# Patient Record
Sex: MALE | Race: WHITE
[De-identification: names, ages, dates, MRNs, and addresses within clinical notes are randomized per-mention and may not be internally consistent; named-entity substitution may affect disease eponyms.]

---

## 2018-04-05 ENCOUNTER — HOSPITAL ENCOUNTER (EMERGENCY)
Dept: HOSPITAL 62 - ER | Age: 53
Discharge: HOME | End: 2018-04-05
Payer: SELF-PAY

## 2018-04-05 VITALS — SYSTOLIC BLOOD PRESSURE: 119 MMHG | DIASTOLIC BLOOD PRESSURE: 73 MMHG

## 2018-04-05 DIAGNOSIS — I10: ICD-10-CM

## 2018-04-05 DIAGNOSIS — Z95.5: ICD-10-CM

## 2018-04-05 DIAGNOSIS — R68.84: ICD-10-CM

## 2018-04-05 DIAGNOSIS — G40.909: ICD-10-CM

## 2018-04-05 DIAGNOSIS — I25.2: ICD-10-CM

## 2018-04-05 DIAGNOSIS — J45.909: ICD-10-CM

## 2018-04-05 DIAGNOSIS — R74.8: ICD-10-CM

## 2018-04-05 DIAGNOSIS — E11.9: ICD-10-CM

## 2018-04-05 DIAGNOSIS — M79.602: ICD-10-CM

## 2018-04-05 DIAGNOSIS — R07.89: Primary | ICD-10-CM

## 2018-04-05 LAB
ADD MANUAL DIFF: NO
ALBUMIN SERPL-MCNC: 4.2 G/DL (ref 3.5–5)
ALP SERPL-CCNC: 73 U/L (ref 38–126)
ALT SERPL-CCNC: 27 U/L (ref 21–72)
ANION GAP SERPL CALC-SCNC: 11 MMOL/L (ref 5–19)
AST SERPL-CCNC: 24 U/L (ref 17–59)
BASOPHILS # BLD AUTO: 0 10^3/UL (ref 0–0.2)
BASOPHILS NFR BLD AUTO: 0.6 % (ref 0–2)
BILIRUB DIRECT SERPL-MCNC: 0.3 MG/DL (ref 0–0.4)
BILIRUB SERPL-MCNC: 0.3 MG/DL (ref 0.2–1.3)
BUN SERPL-MCNC: 12 MG/DL (ref 7–20)
CALCIUM: 9.8 MG/DL (ref 8.4–10.2)
CHLORIDE SERPL-SCNC: 105 MMOL/L (ref 98–107)
CK MB SERPL-MCNC: 0.78 NG/ML (ref ?–4.55)
CK SERPL-CCNC: 152 U/L (ref 55–170)
CO2 SERPL-SCNC: 29 MMOL/L (ref 22–30)
EOSINOPHIL # BLD AUTO: 0.1 10^3/UL (ref 0–0.6)
EOSINOPHIL NFR BLD AUTO: 3.2 % (ref 0–6)
ERYTHROCYTE [DISTWIDTH] IN BLOOD BY AUTOMATED COUNT: 13.3 % (ref 11.5–14)
GLUCOSE SERPL-MCNC: 103 MG/DL (ref 75–110)
HCT VFR BLD CALC: 40.4 % (ref 37.9–51)
HGB BLD-MCNC: 13.5 G/DL (ref 13.5–17)
INR PPP: 0.93
LIPASE SERPL-CCNC: 395.2 U/L (ref 23–300)
LYMPHOCYTES # BLD AUTO: 1.3 10^3/UL (ref 0.5–4.7)
LYMPHOCYTES NFR BLD AUTO: 31.8 % (ref 13–45)
MCH RBC QN AUTO: 29.6 PG (ref 27–33.4)
MCHC RBC AUTO-ENTMCNC: 33.4 G/DL (ref 32–36)
MCV RBC AUTO: 89 FL (ref 80–97)
MONOCYTES # BLD AUTO: 0.4 10^3/UL (ref 0.1–1.4)
MONOCYTES NFR BLD AUTO: 9.5 % (ref 3–13)
NEUTROPHILS # BLD AUTO: 2.3 10^3/UL (ref 1.7–8.2)
NEUTS SEG NFR BLD AUTO: 54.9 % (ref 42–78)
PLATELET # BLD: 267 10^3/UL (ref 150–450)
POTASSIUM SERPL-SCNC: 4.1 MMOL/L (ref 3.6–5)
PROT SERPL-MCNC: 8 G/DL (ref 6.3–8.2)
PROTHROMBIN TIME: 12.9 SEC (ref 11.4–15.4)
RBC # BLD AUTO: 4.56 10^6/UL (ref 4.35–5.55)
SODIUM SERPL-SCNC: 144.5 MMOL/L (ref 137–145)
TOTAL CELLS COUNTED % (AUTO): 100 %
TROPONIN I SERPL-MCNC: < 0.012 NG/ML
WBC # BLD AUTO: 4.2 10^3/UL (ref 4–10.5)

## 2018-04-05 PROCEDURE — 85610 PROTHROMBIN TIME: CPT

## 2018-04-05 PROCEDURE — 93005 ELECTROCARDIOGRAM TRACING: CPT

## 2018-04-05 PROCEDURE — 80053 COMPREHEN METABOLIC PANEL: CPT

## 2018-04-05 PROCEDURE — 99285 EMERGENCY DEPT VISIT HI MDM: CPT

## 2018-04-05 PROCEDURE — 83690 ASSAY OF LIPASE: CPT

## 2018-04-05 PROCEDURE — 71045 X-RAY EXAM CHEST 1 VIEW: CPT

## 2018-04-05 PROCEDURE — 82550 ASSAY OF CK (CPK): CPT

## 2018-04-05 PROCEDURE — 93010 ELECTROCARDIOGRAM REPORT: CPT

## 2018-04-05 PROCEDURE — 85025 COMPLETE CBC W/AUTO DIFF WBC: CPT

## 2018-04-05 PROCEDURE — 83880 ASSAY OF NATRIURETIC PEPTIDE: CPT

## 2018-04-05 PROCEDURE — 84484 ASSAY OF TROPONIN QUANT: CPT

## 2018-04-05 PROCEDURE — 36415 COLL VENOUS BLD VENIPUNCTURE: CPT

## 2018-04-05 PROCEDURE — 82553 CREATINE MB FRACTION: CPT

## 2018-04-05 NOTE — ER DOCUMENT REPORT
ED Medical Screen (RME)





- General


Chief Complaint: Chest Pain


Stated Complaint: CHEST PAIN


Time Seen by Provider: 04/05/18 18:37


Notes: 





RME DISCLOSURE


I have seen this patient as part of a Rapid Medical Evaluation and, if 

applicable, placed any initially appropriate orders. The patient will be seen 

and fully evaluated, including a full history and physical exam, by a provider (

in Main ED or Fast Track) when a room becomes available.





------------------------------------------------------------------





52-year-old male PMH coronary disease CHF here with complaints of left-sided 

chest pain radiating up to the left jaw and down the left arm with shortness of 

breath ongoing for the past few days.  Symptoms worse with exertion.  He has a 

history of 5 stents placed at \[facility]\"".


TRAVEL OUTSIDE OF THE [place] IN LAST 30 DAYS: No





- Related Data


Allergies/Adverse Reactions: 


 





No Known Allergies Allergy (Verified 04/05/18 18:21)


 











Past Medical History





- Social History


Chew tobacco use (# tins/day): No


Frequency of alcohol use: None


Drug Abuse: None





- Past Medical History


Cardiac Medical History: Reports: Hx Heart Attack - 2005, Hx 

Hypercholesterolemia, Hx Hypertension


Endocrine Medical History: Reports: Hx Diabetes Mellitus Type 1


Renal/ Medical History: Denies: Hx Peritoneal Dialysis


Past Surgical History: Reports: Hx Coronary Stent - x3





- Immunizations


Hx Diphtheria, Pertussis, Tetanus Vaccination: Yes





Physical Exam





- Vital signs


Vitals: 





 











Temp Pulse Resp BP Pulse Ox


 


 98.1 F   82   16   142/76 H  98 


 


 04/05/18 18:24  04/05/18 18:24  04/05/18 18:24  04/05/18 18:24  04/05/18 18:24














Course





- Vital Signs


Vital signs: 





 











Temp Pulse Resp BP Pulse Ox


 


 98.1 F   82   16   142/76 H  98 


 


 04/05/18 18:24  04/05/18 18:24  04/05/18 18:24  04/05/18 18:24  04/05/18 18:24

## 2018-04-05 NOTE — EKG REPORT
SEVERITY:- ABNORMAL ECG -

SINUS RHYTHM

LEFT VENTRICULAR HYPERTROPHY

:

Confirmed by: Radhika Ramírez 05-Apr-2018 21:54:12

## 2018-04-05 NOTE — ER DOCUMENT REPORT
ED General





- General


Chief Complaint: Chest Pain


Stated Complaint: CHEST PAIN


Time Seen by Provider: 04/05/18 18:37


TRAVEL OUTSIDE OF THE [place] IN LAST 30 DAYS: No





- HPI


Patient complains to provider of: Chest pain


Notes: 





Patient coming in for chest pain states chest pain day prior to arrival today 

at 1:00 the chest pain left arm pain and jaw pain.  Patient states that he was 

able to tolerate his meal tonight consisting of current greens beats no nausea 

no vomiting fevers or chills no diarrhea however came in for further evaluation 

of his chest pain.  Denies any recent travel denies any shortness of breath.  

Resting comfortably upon my evaluation.  No signs of any obvious distress.





- Related Data


Allergies/Adverse Reactions: 


 





No Known Allergies Allergy (Verified 04/05/18 18:21)


 











Past Medical History





- Social History


Smoking Status: Never Smoker


Chew tobacco use (# tins/day): No


Frequency of alcohol use: None


Drug Abuse: None


Family History: Reviewed & Not Pertinent


Patient has suicidal ideation: No


Patient has homicidal ideation: No





- Past Medical History


Cardiac Medical History: Reports: Hx Heart Attack - 2005, Hx 

Hypercholesterolemia, Hx Hypertension


Pulmonary Medical History: Reports: Hx Asthma


Endocrine Medical History: Reports: Hx Diabetes Mellitus Type 1, Hx Diabetes 

Mellitus Type 2


Renal/ Medical History: Denies: Hx Peritoneal Dialysis


Past Surgical History: Reports: Hx Cardiac Surgery - x5 stents, Hx Coronary 

Stent - x3





- Immunizations


Hx Diphtheria, Pertussis, Tetanus Vaccination: Yes





Review of Systems





- Review of Systems


Constitutional: No symptoms reported


EENT: No symptoms reported


Cardiovascular: Chest pain


Respiratory: No symptoms reported


Gastrointestinal: No symptoms reported


Genitourinary: No symptoms reported


Male Genitourinary: No symptoms reported


Musculoskeletal: No symptoms reported


Skin: No symptoms reported


Hematologic/Lymphatic: No symptoms reported


Neurological/Psychological: No symptoms reported





Physical Exam





- Vital signs


Vitals: 


 











Temp Pulse Resp BP Pulse Ox


 


 98.1 F   82   16   142/76 H  98 


 


 04/05/18 18:24  04/05/18 18:24  04/05/18 18:24  04/05/18 18:24  04/05/18 18:24











Interpretation: Normal





- General


General appearance: Appears well, Alert





- HEENT


Head: Normocephalic, Atraumatic


Eyes: Normal


Pupils: PERRL





- Respiratory


Respiratory status: No respiratory distress


Chest status: Nontender


Breath sounds: Normal


Chest palpation: Normal





- Cardiovascular


Rhythm: Regular


Heart sounds: Normal auscultation


Murmur: No





- Abdominal


Inspection: Normal


Distension: No distension


Bowel sounds: Normal


Tenderness: Nontender


Organomegaly: No organomegaly





- Back


Back: Normal, Nontender





- Extremities


General upper extremity: Normal inspection, Nontender, Normal color, Normal ROM

, Normal temperature


General lower extremity: Normal inspection, Nontender, Normal color, Normal ROM

, Normal temperature, Normal weight bearing.  No: Antoinette's sign





- Neurological


Neuro grossly intact: Yes


Cognition: Normal


Orientation: AAOx4


Henniker Coma Scale Eye Opening: Spontaneous


Henniker Coma Scale Verbal: Oriented


Henniker Coma Scale Motor: Obeys Commands


Henniker Coma Scale Total: 15


Speech: Normal


Motor strength normal: LUE, RUE, LLE, RLE


Sensory: Normal





- Psychological


Associated symptoms: Normal affect, Normal mood





- Skin


Skin Temperature: Warm


Skin Moisture: Dry


Skin Color: Normal





Course





- Re-evaluation


Re-evalutation: 





04/05/18 23:42


The patient has atypical chest pain as the patient's chest pain is not 

suggestive of pulmonary embolus, cardiac ischemia, aortic dissection, or other 

serious etiology. Given the extremely low risk of these diagnoses further 

testing and evaluation for these possibilities does not appear to be indicated 

at this time. The patient has been instructed to return if the symptoms worsen 

or change in any way.  Patient upon reevaluation resting comfortably.  Slight 

elevation in lipase concerned about underlying gastritis.  Explained my 

reasoning with the patient patient never made eye contact by myself continue to 

watch TV in room #14.  Explained to the patient that we will be discharging 

reviewed follow-up with his primary care physician.  Patient states he is 

[facility] stating that we will give his depression to a local 

 to help establish a follow-up appointment.





- Vital Signs


Vital signs: 


 











Temp Pulse Resp BP Pulse Ox


 


 98.2 F   82   17   119/73   100 


 


 04/05/18 22:55  04/05/18 18:24  04/05/18 22:56  04/05/18 22:56  04/05/18 22:56














- Laboratory


Result Diagrams: 


 04/05/18 18:56





 04/05/18 18:56


Laboratory results interpreted by me: 


 











  04/05/18





  18:56


 


Lipase  395.2 H














Discharge





- Discharge


Clinical Impression: 


 Chest pain of uncertain etiology, Seizure disorder





Condition: Good


Disposition: HOME, SELF-CARE


Instructions:  Chest Wall Pain ([facility]), Chest Pain of Unclear Cause ([facility]), 

Prilosec (Acid Pump Inhibitor) ([facility])


Additional Instructions: 


Your EKG laboratory studies today did not show any significant pathology.  Your 

EKG laboratory studies not show any signs of cardiac damage cardiac ischemia.  

Did have slight elevation in lipase is more likely due to underlying gastritis 

with highly recommend she start on medication called proton pump inhibitor such 

as omeprazole.  Follow-up with your doctors at the [facility].  We 

will place your information with 1 of our social workers to make sure to have 

adequate follow-up.  Return to ER symptoms worsen.


Prescriptions: 


Omeprazole 20 mg PO DAILY #30 capsule.dr


Forms:  Return to Work


Referrals: 


NHUNG CANELA MD [Primary Care Provider] - Follow up in 3-5 days

## 2018-04-05 NOTE — RADIOLOGY REPORT (SQ)
EXAM DESCRIPTION:  CHEST SINGLE VIEW



COMPLETED DATE/TIME:  4/5/2018 9:35 pm



REASON FOR STUDY:  sob



COMPARISON:  11/28/2015



EXAM PARAMETERS:  NUMBER OF VIEWS: One view.

TECHNIQUE: Single frontal radiographic view of the chest acquired.

RADIATION DOSE: NA

LIMITATIONS: None.



FINDINGS:  LUNGS AND PLEURA: No acute opacities, masses or pneumothorax. No pleural effusion.

MEDIASTINUM AND HILAR STRUCTURES: Stable.

HEART AND VASCULAR STRUCTURES: Stable.

BONES: No acute findings.

HARDWARE: Cardiac pacer.

OTHER: No other significant finding.



IMPRESSION:  NO ACUTE RADIOGRAPHIC FINDING IN THE CHEST.



TECHNICAL DOCUMENTATION:  JOB ID:  [id number]

[id number]

 2011 [employer]- All Rights Reserved



Reading location - IP/workstation name: [email or web address]